# Patient Record
Sex: MALE | Race: WHITE | NOT HISPANIC OR LATINO | URBAN - METROPOLITAN AREA
[De-identification: names, ages, dates, MRNs, and addresses within clinical notes are randomized per-mention and may not be internally consistent; named-entity substitution may affect disease eponyms.]

---

## 2024-11-11 ENCOUNTER — OUTPATIENT (OUTPATIENT)
Dept: OUTPATIENT SERVICES | Facility: HOSPITAL | Age: 69
LOS: 1 days | End: 2024-11-11
Payer: COMMERCIAL

## 2024-11-11 VITALS
WEIGHT: 197.09 LBS | TEMPERATURE: 97 F | HEIGHT: 72 IN | SYSTOLIC BLOOD PRESSURE: 107 MMHG | OXYGEN SATURATION: 97 % | RESPIRATION RATE: 72 BRPM | DIASTOLIC BLOOD PRESSURE: 73 MMHG | HEART RATE: 76 BPM

## 2024-11-11 DIAGNOSIS — Z98.890 OTHER SPECIFIED POSTPROCEDURAL STATES: Chronic | ICD-10-CM

## 2024-11-11 DIAGNOSIS — Z01.818 ENCOUNTER FOR OTHER PREPROCEDURAL EXAMINATION: ICD-10-CM

## 2024-11-11 DIAGNOSIS — M75.102 UNSPECIFIED ROTATOR CUFF TEAR OR RUPTURE OF LEFT SHOULDER, NOT SPECIFIED AS TRAUMATIC: ICD-10-CM

## 2024-11-11 LAB
A1C WITH ESTIMATED AVERAGE GLUCOSE RESULT: 7.5 % — HIGH (ref 4–5.6)
ALBUMIN SERPL ELPH-MCNC: 3.8 G/DL — SIGNIFICANT CHANGE UP (ref 3.3–5)
ALP SERPL-CCNC: 69 U/L — SIGNIFICANT CHANGE UP (ref 40–120)
ALT FLD-CCNC: 36 U/L — SIGNIFICANT CHANGE UP (ref 12–78)
ANION GAP SERPL CALC-SCNC: 9 MMOL/L — SIGNIFICANT CHANGE UP (ref 5–17)
AST SERPL-CCNC: 18 U/L — SIGNIFICANT CHANGE UP (ref 15–37)
BILIRUB SERPL-MCNC: 0.5 MG/DL — SIGNIFICANT CHANGE UP (ref 0.2–1.2)
BUN SERPL-MCNC: 22 MG/DL — SIGNIFICANT CHANGE UP (ref 7–23)
CALCIUM SERPL-MCNC: 9.3 MG/DL — SIGNIFICANT CHANGE UP (ref 8.5–10.1)
CHLORIDE SERPL-SCNC: 107 MMOL/L — SIGNIFICANT CHANGE UP (ref 96–108)
CO2 SERPL-SCNC: 22 MMOL/L — SIGNIFICANT CHANGE UP (ref 22–31)
CREAT SERPL-MCNC: 0.84 MG/DL — SIGNIFICANT CHANGE UP (ref 0.5–1.3)
EGFR: 96 ML/MIN/1.73M2 — SIGNIFICANT CHANGE UP
ESTIMATED AVERAGE GLUCOSE: 169 MG/DL — HIGH (ref 68–114)
GLUCOSE SERPL-MCNC: 262 MG/DL — HIGH (ref 70–99)
HCT VFR BLD CALC: 44.9 % — SIGNIFICANT CHANGE UP (ref 39–50)
HGB BLD-MCNC: 15.4 G/DL — SIGNIFICANT CHANGE UP (ref 13–17)
MCHC RBC-ENTMCNC: 32.1 PG — SIGNIFICANT CHANGE UP (ref 27–34)
MCHC RBC-ENTMCNC: 34.3 G/DL — SIGNIFICANT CHANGE UP (ref 32–36)
MCV RBC AUTO: 93.5 FL — SIGNIFICANT CHANGE UP (ref 80–100)
NRBC # BLD: 0 /100 WBCS — SIGNIFICANT CHANGE UP (ref 0–0)
PLATELET # BLD AUTO: 171 K/UL — SIGNIFICANT CHANGE UP (ref 150–400)
POTASSIUM SERPL-MCNC: 4.1 MMOL/L — SIGNIFICANT CHANGE UP (ref 3.5–5.3)
POTASSIUM SERPL-SCNC: 4.1 MMOL/L — SIGNIFICANT CHANGE UP (ref 3.5–5.3)
PROT SERPL-MCNC: 7.4 G/DL — SIGNIFICANT CHANGE UP (ref 6–8.3)
RBC # BLD: 4.8 M/UL — SIGNIFICANT CHANGE UP (ref 4.2–5.8)
RBC # FLD: 12.2 % — SIGNIFICANT CHANGE UP (ref 10.3–14.5)
SODIUM SERPL-SCNC: 138 MMOL/L — SIGNIFICANT CHANGE UP (ref 135–145)
WBC # BLD: 6.92 K/UL — SIGNIFICANT CHANGE UP (ref 3.8–10.5)
WBC # FLD AUTO: 6.92 K/UL — SIGNIFICANT CHANGE UP (ref 3.8–10.5)

## 2024-11-11 PROCEDURE — 93005 ELECTROCARDIOGRAM TRACING: CPT

## 2024-11-11 PROCEDURE — 36415 COLL VENOUS BLD VENIPUNCTURE: CPT

## 2024-11-11 PROCEDURE — G0463: CPT

## 2024-11-11 PROCEDURE — 83036 HEMOGLOBIN GLYCOSYLATED A1C: CPT

## 2024-11-11 PROCEDURE — 80053 COMPREHEN METABOLIC PANEL: CPT

## 2024-11-11 PROCEDURE — 85027 COMPLETE CBC AUTOMATED: CPT

## 2024-11-11 PROCEDURE — 93010 ELECTROCARDIOGRAM REPORT: CPT

## 2024-11-11 NOTE — H&P PST ADULT - PROBLEM SELECTOR PLAN 3
A1c pending. Patient advised that (s)he would need endo clearance if A1c came back 9 or above.  Hold Metformin LD 11/11  Hold Mounjaro, LD 11/8, to discuss with surgeon, needs to be off for one week as per protocol   Hold Glipizide DOS  Hold all diabetic meds DOS.   Verbalized understanding. Fingerstick am of surgery.

## 2024-11-11 NOTE — H&P PST ADULT - PROBLEM SELECTOR PLAN 1
scheduled for a left shoulder arthroscopy - possible rotator cuff repair or repair patch - possible biceps tenotomy or tenodesis - debridement - decompression on 11/13 Dr. Monson. Rates his pain a 6/10

## 2024-11-11 NOTE — H&P PST ADULT - NSICDXPASTSURGICALHX_GEN_ALL_CORE_FT
PAST SURGICAL HISTORY:  H/O arthroscopy of right knee     H/O cervical discectomy     H/O colonoscopy

## 2024-11-11 NOTE — H&P PST ADULT - NSICDXPASTMEDICALHX_GEN_ALL_CORE_FT
PAST MEDICAL HISTORY:  HLD (hyperlipidemia)     HTN (hypertension)     Type 2 diabetes mellitus     Unspecified rotator cuff tear or rupture of left shoulder, not specified as traumatic

## 2024-11-11 NOTE — H&P PST ADULT - NSANTHOSAYNRD_GEN_A_CORE
Denies ETHEL/No. ETHEL screening performed.  STOP BANG Legend: 0-2 = LOW Risk; 3-4 = INTERMEDIATE Risk; 5-8 = HIGH Risk

## 2024-11-11 NOTE — H&P PST ADULT - HISTORY OF PRESENT ILLNESS
70 yo male with HTN, HLD, and T2DM, presents to PST with left shoulder pain secondary to a work related injury. Reports injuring his left shoulder and c-spine on 7/23/21 when he slipped on steps getting onto the truck. S/P c-spine surgery with instrumentation in 2023, now scheduled for a left shoulder arthroscopy - possible rotator cuff repair or repair patch - possible biceps tenotomy or tenodesis - debridement - decompression on 11/13 Dr. Monson. Rates his pain a 6/10 68 yo male with HTN, HLD, and T2DM, presents to PST with left shoulder pain secondary to a work related injury. Reports injuring his left shoulder and c-spine on 7/23/21 when he slipped on steps getting onto the truck. S/P c-spine surgery with instrumentation in 2023, now scheduled for a left shoulder arthroscopy - possible rotator cuff repair or repair patch - possible biceps tenotomy or tenodesis - debridement - decompression on 11/13 Dr. Monson. Rates his pain a 6/10    Of note... Pt on Mounjaro, last dose on Friday 11/8, needs to be off week, needs to call surgeon office to reschedule for later date. Surgeon to be emailed.

## 2024-11-12 DIAGNOSIS — E11.9 TYPE 2 DIABETES MELLITUS WITHOUT COMPLICATIONS: ICD-10-CM

## 2024-11-12 DIAGNOSIS — M75.102 UNSPECIFIED ROTATOR CUFF TEAR OR RUPTURE OF LEFT SHOULDER, NOT SPECIFIED AS TRAUMATIC: ICD-10-CM

## 2024-11-12 DIAGNOSIS — Z01.818 ENCOUNTER FOR OTHER PREPROCEDURAL EXAMINATION: ICD-10-CM

## 2024-11-18 NOTE — ASU PATIENT PROFILE, ADULT - NSICDXPASTSURGICALHX_GEN_ALL_CORE_FT
Physical Therapy     Referred by: Anuja Valdez PA-C; Medical Diagnosis (from order):    Diagnosis Information      Diagnosis    V45.4 (ICD-9-CM) - Z98.1 (ICD-10-CM) - History of thoracic spinal fusion    V45.4 (ICD-9-CM) - Z98.1 (ICD-10-CM) - S/P lumbar spinal fusion                Daily Treatment Note    Visit:  4     SUBJECTIVE                                                                                                               Patient reports pain is less today at 4/10. Pain today is more tightness and some numbness at thoracic spine.     Pain / Symptoms:  Pain rating (out of 10): Current: 4     OBJECTIVE                                                                                                                        TREATMENT                                                                                                                  Therapeutic Exercise:  Name: Aliya Laura    Diagnosis: History of thoracic and lumbar spinal fusion 2019 - work comp-related   Precautions: Belgian-speaking, no lifting > 25#      Today's Exercises:     Hip flexor stretch at stairs 30\" x3 bilateral - defer 10/19/2021   Hamstring stretch in sitting 30\" x3 bilateral    Prone quadriceps stretch bilateral with therapist  Prone hip internal/external rotation   Single knee to chest stretch   Piriformis stretch   SciFit x5'   Lat stretch - TRX 5\" holds x10     Manual Therapy:  Soft tissue mobilization upper-middle thoracic paraspinals, bilateral quadratus lumborum, and bilateral lumbar paraspinals, piriformis       Neuromuscular Re-Education:  Neuromuscular Re-education to improve quality of motion, improve posture and postural awareness, improve proprioception, improve balance, improve coordination  and improve kinesthetic sense:      Rows - 20# speed pulley 2x15, seated on Swiss ball (increase next)   Lat pull downs - 40# speed pulley 2x15, seated on Swiss ball   Bilateral external rotation - green band 2x12  -  defer  hooklying bracing with Pilates ring 3\" holds 2x10 each at 90/90 position   Clamshells - green band 3x10   Side steps - orange band x4 laps at parallel bars  - defer  Sit to stand to chair +1 airex 2x10 - defer  Mixing bowl 20# 2x10 clockwise/counterclockwise - defer  Monster walk - orange band x4 laps at parallel bars - defer  Retro walk 4 plates x5, 5 plates x5   Bridge on Swiss ball 2x10    Bird dog - upper extremity only x20 alternating, x10 lower extremities alternating      Palmer carry 10# dumb bell x4 laps, 50' - defer  Box lift 10# x5, 20# x5   max cues for hip hinging, squat mechanics  Bilateral horizontal abduction green band 2x10 - cues for upper trapezius   New - Palloff press 20# 2x10   Wall walk orange band x3 laps     Skilled input: verbal instruction/cues and tactile instruction/cues    Writer verbally educated and received verbal consent for hand placement, positioning of patient, and techniques to be performed today from patient for clothing adjustments for techniques, modality application, therapist position for techniques and hand placement and palpation for techniques as described above and how they are pertinent to the patient's plan of care.    Home Exercise Program/Education Materials: Access Code: [email or web address]  URL: [email or web address]  Date: 10/15/2021  Prepared by: Terri Neal    Exercises  Standing Shoulder Row with Anchored Resistance - 1 x daily - 7 x weekly - 3 sets - 10 reps  Shoulder Extension with Resistance - 1 x daily - 7 x weekly - 3 sets - 10 reps  Shoulder External Rotation and Scapular Retraction with Resistance - 1 x daily - 7 x weekly - 3 sets - 10 reps  Seated Hamstring Stretch - 2 x daily - 7 x weekly - 3 sets - 30\" hold  Hip Flexor Stretch on Step - 2 x daily - 7 x weekly - 3 sets - 30\" hold  Issued green band for home 10/15/2021.     Un-attended Electrical Stimulation (03725/): Interferential Current  Pulse Rate:  Hz  Intensity:  patient comfort  In conjunction with: cold pack  Duration: 15 minutes  Results: decreased pain and decreased swelling  Reaction: no adverse reaction to treatment      ASSESSMENT                                                                                                             Minimal soft tissue restrictions throughout thoracic region > lumbar region today with improved mobility with soft tissue mobilization. Patient continues to be moderate-severely limited with hamstring and quadriceps/hip flexor muscle flexibility. Patient reports continued compliance with home exercise program. Able to progress weight with box lift from floor to shelf at waist height with initial cueing for hip hinging, maintaining box within base of support, posture - improved return demonstration. Progressed core stability with anti-rotation exercise with good tolerance. Increased weight with standing retro walk/pull as well as with seated lat pull down. Good tolerance to all exercises today without any increase in pain reported.        Patient Education:   Results of above outlined education: Verbalizes understanding and Demonstrates understanding      PLAN                                                                                                                           Suggestions for next session as indicated: Progress per plan of care         Therapy procedure time and total treatment time can be found documented on the Time Entry flowsheet   PAST SURGICAL HISTORY:  H/O arthroscopy of right knee     H/O cervical discectomy     H/O colonoscopy

## 2024-11-18 NOTE — ASU PATIENT PROFILE, ADULT - FALL HARM RISK - UNIVERSAL INTERVENTIONS
Bed in lowest position, wheels locked, appropriate side rails in place/Call bell, personal items and telephone in reach/Instruct patient to call for assistance before getting out of bed or chair/Non-slip footwear when patient is out of bed/Platinum to call system/Physically safe environment - no spills, clutter or unnecessary equipment/Purposeful Proactive Rounding/Room/bathroom lighting operational, light cord in reach

## 2024-11-18 NOTE — ASU PATIENT PROFILE, ADULT - ACCEPTABLE
LMOM for family, to please bring in records for their visit tomorrow as we have no information in her chart and because she is being seen as a new patient to please remember to bring glucometer and any blood sugar logs they may have 3

## 2024-11-19 ENCOUNTER — OUTPATIENT (OUTPATIENT)
Dept: OUTPATIENT SERVICES | Facility: HOSPITAL | Age: 69
LOS: 1 days | Discharge: HOME | End: 2024-11-19
Payer: COMMERCIAL

## 2024-11-19 VITALS
TEMPERATURE: 98 F | HEART RATE: 84 BPM | SYSTOLIC BLOOD PRESSURE: 154 MMHG | OXYGEN SATURATION: 95 % | RESPIRATION RATE: 15 BRPM | DIASTOLIC BLOOD PRESSURE: 74 MMHG

## 2024-11-19 VITALS
RESPIRATION RATE: 14 BRPM | OXYGEN SATURATION: 96 % | TEMPERATURE: 98 F | HEART RATE: 74 BPM | HEIGHT: 72 IN | WEIGHT: 197.09 LBS | DIASTOLIC BLOOD PRESSURE: 79 MMHG | SYSTOLIC BLOOD PRESSURE: 131 MMHG

## 2024-11-19 DIAGNOSIS — Z98.890 OTHER SPECIFIED POSTPROCEDURAL STATES: Chronic | ICD-10-CM

## 2024-11-19 DIAGNOSIS — M75.102 UNSPECIFIED ROTATOR CUFF TEAR OR RUPTURE OF LEFT SHOULDER, NOT SPECIFIED AS TRAUMATIC: ICD-10-CM

## 2024-11-19 DIAGNOSIS — Z01.818 ENCOUNTER FOR OTHER PREPROCEDURAL EXAMINATION: ICD-10-CM

## 2024-11-19 LAB
GLUCOSE BLDC GLUCOMTR-MCNC: 122 MG/DL — HIGH (ref 70–99)
GLUCOSE BLDC GLUCOMTR-MCNC: 178 MG/DL — HIGH (ref 70–99)

## 2024-11-19 PROCEDURE — 88304 TISSUE EXAM BY PATHOLOGIST: CPT | Mod: 26

## 2024-11-19 PROCEDURE — C1889: CPT

## 2024-11-19 PROCEDURE — 29821 SHO ARTHRS SRG COMPL SYNVCT: CPT | Mod: LT

## 2024-11-19 PROCEDURE — 29826 SHO ARTHRS SRG DECOMPRESSION: CPT | Mod: LT

## 2024-11-19 PROCEDURE — C1763: CPT

## 2024-11-19 PROCEDURE — C9399: CPT

## 2024-11-19 PROCEDURE — 88304 TISSUE EXAM BY PATHOLOGIST: CPT

## 2024-11-19 PROCEDURE — 82962 GLUCOSE BLOOD TEST: CPT

## 2024-11-19 PROCEDURE — C1713: CPT

## 2024-11-19 PROCEDURE — 29999 UNLISTED PX ARTHROSCOPY: CPT | Mod: LT

## 2024-11-19 PROCEDURE — 29823 SHO ARTHRS SRG XTNSV DBRDMT: CPT | Mod: LT

## 2024-11-19 DEVICE — STAPLE TENDON: Type: IMPLANTABLE DEVICE | Status: FUNCTIONAL

## 2024-11-19 DEVICE — ANCHOR ARTHROSCOPIC DEL SYS ADVANCED: Type: IMPLANTABLE DEVICE | Status: FUNCTIONAL

## 2024-11-19 DEVICE — S&N BIOINDUCTIVE IMPLANT W/ ARTHROSCOPIC DELIVERY SYSTEM MEDIUM: Type: IMPLANTABLE DEVICE | Status: FUNCTIONAL

## 2024-11-19 RX ORDER — GLIPIZIDE 5 MG
1 TABLET ORAL
Refills: 0 | DISCHARGE

## 2024-11-19 RX ORDER — 0.9 % SODIUM CHLORIDE 0.9 %
1000 INTRAVENOUS SOLUTION INTRAVENOUS
Refills: 0 | Status: DISCONTINUED | OUTPATIENT
Start: 2024-11-19 | End: 2024-11-19

## 2024-11-19 RX ORDER — HYDROMORPHONE HYDROCHLORIDE 2 MG/1
0.5 TABLET ORAL
Refills: 0 | Status: DISCONTINUED | OUTPATIENT
Start: 2024-11-19 | End: 2024-11-19

## 2024-11-19 RX ORDER — OXYCODONE AND ACETAMINOPHEN 5; 325 MG/1; MG/1
1 TABLET ORAL
Qty: 28 | Refills: 0
Start: 2024-11-19 | End: 2024-11-25

## 2024-11-19 RX ORDER — SIMVASTATIN 80 MG/1
1 TABLET, FILM COATED ORAL
Refills: 0 | DISCHARGE

## 2024-11-19 RX ORDER — ICOSAPENT ETHYL 1 G/1
1 CAPSULE, LIQUID FILLED ORAL
Refills: 0 | DISCHARGE

## 2024-11-19 RX ORDER — OMEPRAZOLE 10 MG
1 CAPSULE,DELAYED RELEASE (ENTERIC COATED) ORAL
Refills: 0 | DISCHARGE

## 2024-11-19 RX ORDER — LISINOPRIL 40 MG
1 TABLET ORAL
Refills: 0 | DISCHARGE

## 2024-11-19 RX ORDER — TIRZEPATIDE 5 MG/.5ML
7.5 INJECTION, SOLUTION SUBCUTANEOUS
Refills: 0 | DISCHARGE

## 2024-11-19 RX ORDER — ASPIRIN/MAG CARB/ALUMINUM AMIN 325 MG
1 TABLET ORAL
Refills: 0 | DISCHARGE

## 2024-11-19 RX ORDER — ONDANSETRON HYDROCHLORIDE 4 MG/1
4 TABLET, FILM COATED ORAL ONCE
Refills: 0 | Status: DISCONTINUED | OUTPATIENT
Start: 2024-11-19 | End: 2024-11-19

## 2024-11-19 RX ORDER — METFORMIN HYDROCHLORIDE 500 MG/1
2 TABLET, EXTENDED RELEASE ORAL
Refills: 0 | DISCHARGE

## 2024-11-19 RX ORDER — CEFAZOLIN SODIUM 10 G
2000 VIAL (EA) INJECTION ONCE
Refills: 0 | Status: COMPLETED | OUTPATIENT
Start: 2024-11-19 | End: 2024-11-19

## 2024-11-19 RX ADMIN — HYDROMORPHONE HYDROCHLORIDE 0.5 MILLIGRAM(S): 2 TABLET ORAL at 17:21

## 2024-11-19 RX ADMIN — HYDROMORPHONE HYDROCHLORIDE 0.5 MILLIGRAM(S): 2 TABLET ORAL at 16:51

## 2024-11-19 RX ADMIN — Medication 75 MILLILITER(S): at 13:51

## 2024-11-19 RX ADMIN — Medication 75 MILLILITER(S): at 16:44

## 2024-11-19 NOTE — ASU DISCHARGE PLAN (ADULT/PEDIATRIC) - FINANCIAL ASSISTANCE
[facility] provides services at a reduced cost to those who are determined to be eligible through [facility]’s financial assistance program. Information regarding [facility]’s financial assistance program can be found by going to [email or web address] or by calling [phone].

## 2024-11-19 NOTE — ASU DISCHARGE PLAN (ADULT/PEDIATRIC) - ASU DC SPECIAL INSTRUCTIONSFT
Keep lef shoulder dressings clean and dry.      Do not use/move the left shoulder.   You may move the fingers/hand/wrist, and you may periodically slip your arm out of the sling and bend/straighten your elbow for comfort/prevent stiffness.      Do not drive.     Keep dressings in place, do not remove    It will be more comfortable to sleep in a recliner, or in bed with multiple pillows behind you to prop you up.  Lying flat will be extremely uncomfortable/painful.      Ice the shoulder 20 min on/20 min off for 2-3 hours twice a day.            Follow-up with Dr. Monson in his office next week - call office for appointment if not already made (see below). Keep left shoulder dressings clean and dry.      Do not use/move the left shoulder.   You may move the fingers/hand/wrist, and you may periodically slip your arm out of the sling and bend/straighten your elbow for comfort/prevent stiffness.      Do not drive.     Keep dressings in place, do not remove    It will be more comfortable to sleep in a recliner, or in bed with multiple pillows behind you to prop you up.  Lying flat will be extremely uncomfortable/painful.      Ice the shoulder 20 min on/20 min off for 2-3 hours twice a day.            Follow-up with Dr. Monson in his office next week - call office for appointment if not already made (see below).

## 2024-11-19 NOTE — ASU DISCHARGE PLAN (ADULT/PEDIATRIC) - NS MD DC FALL RISK RISK
For information on Fall & Injury Prevention, visit: [email or web address] OR  [email or web address] OR  https://www.cdc.gov/steadi/patient.html

## 2024-11-19 NOTE — ASU DISCHARGE PLAN (ADULT/PEDIATRIC) - CARE PROVIDER_API CALL
Pepe Monson  Orthopaedic Surgery  [address] [place] [id number]  Phone: [phone]  Fax: [phone]  Follow Up Time:

## 2024-11-19 NOTE — ASU PREOP CHECKLIST - ALLERGY BAND ON
Patient to avoid alcohol and also tylenol Follow up with IR and hem/onc as well as gi no known allergies

## 2024-11-19 NOTE — BRIEF OPERATIVE NOTE - NSICDXBRIEFPOSTOP_GEN_ALL_CORE_FT
multiple medical complaints
POST-OP DIAGNOSIS:  Complete tear of left rotator cuff 19-Nov-2024 16:40:49  Frantz Gonzalez

## 2024-11-21 LAB — SURGICAL PATHOLOGY STUDY: SIGNIFICANT CHANGE UP

## (undated) DEVICE — LAP PAD W RING 18 X 18"

## (undated) DEVICE — SUT OPUS SPEEDSTITCH NEEDLE CASSETTE INSERT

## (undated) DEVICE — PLV-STRYKER SYSTEM 8: Type: DURABLE MEDICAL EQUIPMENT

## (undated) DEVICE — CANNULA S&N CLEAR-TRAC FLEXIBLE THREADED 8 X 72MM

## (undated) DEVICE — DRSG STOCKINETTE IMPERVIOUS XL 12 X 48"

## (undated) DEVICE — NDL HYPO SAFE 22G X 1.5" (BLACK)

## (undated) DEVICE — SUT SMARTSTITCH PERFECTPASSER CONNECTOR

## (undated) DEVICE — TUBING DEPUY MITEK FMS INFLOW

## (undated) DEVICE — SLING SHOULDER IMMOBILIZER ELASTIC MALE XL

## (undated) DEVICE — VENODYNE/SCD SLEEVE CALF MEDIUM

## (undated) DEVICE — POSITIONER S&N FACE MASK SPIDER 2

## (undated) DEVICE — SOL IRR BAG NS 0.9% 3000ML

## (undated) DEVICE — DRSG KLING 2"

## (undated) DEVICE — BUR LINVATEC VORTEX 4.5 GREEN

## (undated) DEVICE — TOGGELOC ZIPLOOP DISP

## (undated) DEVICE — DRSG ADAPTIC 3 X 3"

## (undated) DEVICE — PLV-SCD MACHINE: Type: DURABLE MEDICAL EQUIPMENT

## (undated) DEVICE — TUBING DEPUY MITEK FMS OUTFLOW

## (undated) DEVICE — ELCTR WAND COVATOR 20

## (undated) DEVICE — SUCTION YANKAUER NO CONTROL VENT

## (undated) DEVICE — DRSG COMBINE 5 X 9"

## (undated) DEVICE — WARMING BLANKET LOWER ADULT

## (undated) DEVICE — PLV-LINVATEC SCOPE HD 70 DEGREE: Type: DURABLE MEDICAL EQUIPMENT

## (undated) DEVICE — DRSG CURITY GAUZE SPONGE 4 X 4" 12-PLY

## (undated) DEVICE — DRAPE TOWEL BLUE 17" X 24"

## (undated) DEVICE — DRAPE 3/4 SHEET W REINFORCEMENT 56X77"

## (undated) DEVICE — GLV 8 PROTEXIS (WHITE)

## (undated) DEVICE — TUBING SUCTION 20FT

## (undated) DEVICE — SUT MONOSOF 3-0 18" P-12

## (undated) DEVICE — SUT PERFECTPASSER MAGNUMWIRE COBRAID BLUE

## (undated) DEVICE — SHAVER BLADE S&N FULL RADIUS ELITE 3.5MM STRAIGHT (BEIGE)

## (undated) DEVICE — VENODYNE/SCD SLEEVE CALF LARGE

## (undated) DEVICE — DRAPE U (BLUE) 60 X 60"

## (undated) DEVICE — SUT MAXON 1 27" C-9

## (undated) DEVICE — SUT OPUS SPEEDSTICH COBRAID BLUE

## (undated) DEVICE — SHAVER BLADE LINVATEC FULL RADIUS RESECTOR 3.5MM

## (undated) DEVICE — S&N ARTHROCARE WAND COBLATION WEREWOLF FLOW 90

## (undated) DEVICE — EXPRESSBRAID SNGLE SZ 2 BLUE WHITE

## (undated) DEVICE — SYR LUER LOK 50CC

## (undated) DEVICE — SUT MAXON 1 27" T-12

## (undated) DEVICE — SYR LUER LOK 20CC

## (undated) DEVICE — NDL SPINAL 18G X 3.5" (PINK)

## (undated) DEVICE — CANNULA LINVATEC SHOULDER 7CM (GREY & ORANGE)

## (undated) DEVICE — SAW BLADE LINVATEC SAGITTAL MIC 9.5X25.5X0.4MM

## (undated) DEVICE — FLOORMAT SURGISAFE ABSORBANT WHITE 36" X 72"

## (undated) DEVICE — PACK SHOULDER ARTHROSCOPY

## (undated) DEVICE — PLV-LINVATEC ARTHROSCOPIC TRAY: Type: DURABLE MEDICAL EQUIPMENT